# Patient Record
Sex: MALE
[De-identification: names, ages, dates, MRNs, and addresses within clinical notes are randomized per-mention and may not be internally consistent; named-entity substitution may affect disease eponyms.]

---

## 2019-01-15 PROBLEM — Z00.00 ENCOUNTER FOR PREVENTIVE HEALTH EXAMINATION: Status: ACTIVE | Noted: 2019-01-15

## 2019-03-26 ENCOUNTER — APPOINTMENT (OUTPATIENT)
Dept: HEART AND VASCULAR | Facility: CLINIC | Age: 64
End: 2019-03-26
Payer: COMMERCIAL

## 2019-03-26 VITALS
OXYGEN SATURATION: 96 % | SYSTOLIC BLOOD PRESSURE: 100 MMHG | RESPIRATION RATE: 14 BRPM | HEART RATE: 74 BPM | WEIGHT: 216.05 LBS | TEMPERATURE: 97.6 F | BODY MASS INDEX: 33.51 KG/M2 | HEIGHT: 67.5 IN | DIASTOLIC BLOOD PRESSURE: 70 MMHG

## 2019-03-26 DIAGNOSIS — L40.9 PSORIASIS, UNSPECIFIED: ICD-10-CM

## 2019-03-26 DIAGNOSIS — R94.31 ABNORMAL ELECTROCARDIOGRAM [ECG] [EKG]: ICD-10-CM

## 2019-03-26 DIAGNOSIS — Z78.9 OTHER SPECIFIED HEALTH STATUS: ICD-10-CM

## 2019-03-26 DIAGNOSIS — I10 ESSENTIAL (PRIMARY) HYPERTENSION: ICD-10-CM

## 2019-03-26 PROCEDURE — 99244 OFF/OP CNSLTJ NEW/EST MOD 40: CPT

## 2019-03-26 PROCEDURE — 93306 TTE W/DOPPLER COMPLETE: CPT

## 2019-03-26 PROCEDURE — 93000 ELECTROCARDIOGRAM COMPLETE: CPT

## 2019-03-26 RX ORDER — LISINOPRIL 20 MG/1
20 TABLET ORAL TWICE DAILY
Qty: 2 | Refills: 0 | Status: ACTIVE | COMMUNITY

## 2019-03-26 NOTE — DISCUSSION/SUMMARY
[FreeTextEntry1] : At the time of the patient's visit an Echocardiogram was performed to evaluate his LV function. \par \par At the time of the visit the results were reviewed with patient \par \par I asked him to return for a stress-echocardiogram

## 2019-03-26 NOTE — REVIEW OF SYSTEMS
[Eyeglasses] : currently wearing eyeglasses [Dyspnea on exertion] : dyspnea during exertion [Negative] : Respiratory [Shortness Of Breath] : no shortness of breath [Chest  Pressure] : no chest pressure [Chest Pain] : no chest pain [Lower Ext Edema] : no extremity edema [Leg Claudication] : no intermittent leg claudication [Palpitations] : no palpitations

## 2019-03-26 NOTE — HISTORY OF PRESENT ILLNESS
[FreeTextEntry1] : 63 year male who was told of a change on his recent EKG compared to previous. He walking on incline and cardio 3-4 days a week but injured his knee and switched to elliptical. He denies needing to stop due to symptoms of chest pain, dizziness or palpitations. He attributes his SOB on exertion to his weight. He reports having an increase in his Lisinopril and addition of a diuretic in the past 2 months

## 2019-06-13 ENCOUNTER — APPOINTMENT (OUTPATIENT)
Dept: HEART AND VASCULAR | Facility: CLINIC | Age: 64
End: 2019-06-13
Payer: COMMERCIAL

## 2019-06-13 DIAGNOSIS — R06.09 OTHER FORMS OF DYSPNEA: ICD-10-CM

## 2019-06-13 DIAGNOSIS — I35.1 NONRHEUMATIC AORTIC (VALVE) INSUFFICIENCY: ICD-10-CM

## 2019-06-13 PROCEDURE — 93351 STRESS TTE COMPLETE: CPT

## 2019-06-13 NOTE — DISCUSSION/SUMMARY
[FreeTextEntry1] : At the time of the patient's visit a Stress Echocardiogram was performed to evaluate for exercise induced arrhythmia and ischemia. I urged him to continue diet, exercise and his current medications\par \par At the time of the visit the results were reviewed with patient

## 2019-06-13 NOTE — HISTORY OF PRESENT ILLNESS
[FreeTextEntry1] : 63 year male who is on Lisinopril and HCTZ in the morning. He is exercising on a treadmill and would like to increase activity

## 2019-06-14 RX ORDER — HYDROCHLOROTHIAZIDE 12.5 MG/1
12.5 TABLET ORAL DAILY
Qty: 30 | Refills: 3 | Status: ACTIVE | COMMUNITY
Start: 2019-06-14